# Patient Record
Sex: FEMALE | Race: WHITE | NOT HISPANIC OR LATINO | Employment: OTHER | URBAN - METROPOLITAN AREA
[De-identification: names, ages, dates, MRNs, and addresses within clinical notes are randomized per-mention and may not be internally consistent; named-entity substitution may affect disease eponyms.]

---

## 2017-01-25 ENCOUNTER — APPOINTMENT (OUTPATIENT)
Dept: OTHER | Facility: IMAGING CENTER | Age: 67
End: 2017-01-25

## 2017-01-25 PROCEDURE — 97530 THERAPEUTIC ACTIVITIES: CPT | Performed by: FAMILY MEDICINE

## 2017-02-16 ENCOUNTER — APPOINTMENT (OUTPATIENT)
Dept: OTHER | Facility: IMAGING CENTER | Age: 67
End: 2017-02-16

## 2017-02-16 PROCEDURE — 97530 THERAPEUTIC ACTIVITIES: CPT | Performed by: FAMILY MEDICINE

## 2017-03-01 ENCOUNTER — APPOINTMENT (OUTPATIENT)
Dept: OTHER | Facility: IMAGING CENTER | Age: 67
End: 2017-03-01

## 2017-03-01 PROCEDURE — 97530 THERAPEUTIC ACTIVITIES: CPT | Performed by: FAMILY MEDICINE

## 2017-03-13 ENCOUNTER — TELEPHONE (OUTPATIENT)
Dept: CARDIOLOGY | Facility: MEDICAL CENTER | Age: 67
End: 2017-03-13

## 2017-03-14 NOTE — TELEPHONE ENCOUNTER
PAR approved:  Ellen Buenrostro Key: [id number] - PA Case ID: [id number]   Outcome   Approvedtoday   Questionnaire submitted. PA Case [id number] Status: Approved. Authorization and Notifications Pending.   DrugCrestor 20MG tablets   [facility] Form

## 2017-03-15 ENCOUNTER — APPOINTMENT (OUTPATIENT)
Dept: OTHER | Facility: IMAGING CENTER | Age: 67
End: 2017-03-15

## 2017-03-15 PROCEDURE — 97530 THERAPEUTIC ACTIVITIES: CPT | Performed by: FAMILY MEDICINE

## 2017-03-20 ENCOUNTER — TELEPHONE (OUTPATIENT)
Dept: CARDIOLOGY | Facility: MEDICAL CENTER | Age: 67
End: 2017-03-20

## 2017-03-20 DIAGNOSIS — E78.00 PURE HYPERCHOLESTEROLEMIA: ICD-10-CM

## 2017-03-20 NOTE — TELEPHONE ENCOUNTER
----- Message from María Elena Callaway sent at 3/20/2017 10:48 AM PDT -----  Regarding: lab order mailed to home  RG/Rita        Patient would like lab order to be mailed to her home. She can be reached at [phone].

## 2017-03-23 ENCOUNTER — APPOINTMENT (OUTPATIENT)
Dept: OTHER | Facility: IMAGING CENTER | Age: 67
End: 2017-03-23

## 2017-03-23 PROCEDURE — 97530 THERAPEUTIC ACTIVITIES: CPT | Performed by: FAMILY MEDICINE

## 2017-03-29 ENCOUNTER — APPOINTMENT (OUTPATIENT)
Dept: OTHER | Facility: IMAGING CENTER | Age: 67
End: 2017-03-29

## 2017-03-29 PROCEDURE — 97530 THERAPEUTIC ACTIVITIES: CPT | Performed by: FAMILY MEDICINE

## 2017-04-05 LAB
ALBUMIN SERPL-MCNC: 4.5 G/DL (ref 3.6–4.8)
ALBUMIN/GLOB SERPL: 2.3 {RATIO} (ref 1.2–2.2)
ALP SERPL-CCNC: 65 IU/L (ref 39–117)
ALT SERPL-CCNC: 39 IU/L (ref 0–32)
AST SERPL-CCNC: 43 IU/L (ref 0–40)
BILIRUB SERPL-MCNC: 0.4 MG/DL (ref 0–1.2)
BUN SERPL-MCNC: 11 MG/DL (ref 8–27)
BUN/CREAT SERPL: 14 (ref 12–28)
CALCIUM SERPL-MCNC: 9.3 MG/DL (ref 8.7–10.3)
CHLORIDE SERPL-SCNC: 103 MMOL/L (ref 96–106)
CHOLEST SERPL-MCNC: 205 MG/DL (ref 100–199)
CO2 SERPL-SCNC: 26 MMOL/L (ref 18–29)
COMMENT 011824: ABNORMAL
CREAT SERPL-MCNC: 0.78 MG/DL (ref 0.57–1)
GLOBULIN SER CALC-MCNC: 2 G/DL (ref 1.5–4.5)
GLUCOSE SERPL-MCNC: 94 MG/DL (ref 65–99)
HDLC SERPL-MCNC: 75 MG/DL
LDLC SERPL CALC-MCNC: 121 MG/DL (ref 0–99)
POTASSIUM SERPL-SCNC: 4.3 MMOL/L (ref 3.5–5.2)
PROT SERPL-MCNC: 6.5 G/DL (ref 6–8.5)
SODIUM SERPL-SCNC: 142 MMOL/L (ref 134–144)
TRIGL SERPL-MCNC: 47 MG/DL (ref 0–149)
VLDLC SERPL CALC-MCNC: 9 MG/DL (ref 5–40)

## 2017-04-12 ENCOUNTER — APPOINTMENT (OUTPATIENT)
Dept: OTHER | Facility: IMAGING CENTER | Age: 67
End: 2017-04-12

## 2017-04-12 PROCEDURE — 97530 THERAPEUTIC ACTIVITIES: CPT | Performed by: FAMILY MEDICINE

## 2017-04-18 ENCOUNTER — OFFICE VISIT (OUTPATIENT)
Dept: CARDIOLOGY | Facility: MEDICAL CENTER | Age: 67
End: 2017-04-18
Payer: MEDICARE

## 2017-04-18 VITALS
HEIGHT: 68 IN | OXYGEN SATURATION: 94 % | WEIGHT: 131 LBS | DIASTOLIC BLOOD PRESSURE: 70 MMHG | BODY MASS INDEX: 19.85 KG/M2 | SYSTOLIC BLOOD PRESSURE: 124 MMHG | HEART RATE: 92 BPM

## 2017-04-18 DIAGNOSIS — I25.10 ATHEROSCLEROSIS OF NATIVE CORONARY ARTERY OF NATIVE HEART WITHOUT ANGINA PECTORIS: ICD-10-CM

## 2017-04-18 DIAGNOSIS — E78.00 PURE HYPERCHOLESTEROLEMIA: ICD-10-CM

## 2017-04-18 DIAGNOSIS — E78.01 FAMILIAL HYPERCHOLESTEROLEMIA: ICD-10-CM

## 2017-04-18 DIAGNOSIS — I10 ESSENTIAL HYPERTENSION: ICD-10-CM

## 2017-04-18 PROCEDURE — 3014F SCREEN MAMMO DOC REV: CPT | Mod: 8P | Performed by: INTERNAL MEDICINE

## 2017-04-18 PROCEDURE — G8598 ASA/ANTIPLAT THER USED: HCPCS | Performed by: INTERNAL MEDICINE

## 2017-04-18 PROCEDURE — 1101F PT FALLS ASSESS-DOCD LE1/YR: CPT | Mod: 8P | Performed by: INTERNAL MEDICINE

## 2017-04-18 PROCEDURE — G8420 CALC BMI NORM PARAMETERS: HCPCS | Performed by: INTERNAL MEDICINE

## 2017-04-18 PROCEDURE — 3017F COLORECTAL CA SCREEN DOC REV: CPT | Mod: 8P | Performed by: INTERNAL MEDICINE

## 2017-04-18 PROCEDURE — G8432 DEP SCR NOT DOC, RNG: HCPCS | Performed by: INTERNAL MEDICINE

## 2017-04-18 PROCEDURE — 4040F PNEUMOC VAC/ADMIN/RCVD: CPT | Mod: 8P | Performed by: INTERNAL MEDICINE

## 2017-04-18 PROCEDURE — 1036F TOBACCO NON-USER: CPT | Performed by: INTERNAL MEDICINE

## 2017-04-18 PROCEDURE — 99214 OFFICE O/P EST MOD 30 MIN: CPT | Performed by: INTERNAL MEDICINE

## 2017-04-18 NOTE — Clinical Note
[facility]   [address]   [address]  [place] [address]  Phone: [phone]  Fax: [phone]              Ellen Buenrostro  1950    Encounter Date: 4/18/2017    Parker Mancia M.D.          PROGRESS NOTE:  Subjective:   Ellen Buenrostro is a 66 y.o. female who presents today in follow-up for hypertension and hyperlipidemia.  She is asymptomatic.  She is very stressed because her  is dealing with prostate cancer.  She has no symptoms of heart disease.  Her brother takes Repatha for his severe hypercholesterolemia  Recent labs continues to straight mild elevation of transaminases as has been present for years. It is for this reason I am reluctant to increase Crestor to maximum dose of 40 mg per day.    Past Medical History   Diagnosis Date   • Undiagnosed cardiac murmurs    • Pure hypercholesterolemia    • Murmur      History reviewed. No pertinent past surgical history.  History reviewed. No pertinent family history.  History   Smoking status   • Never Smoker    Smokeless tobacco   • Former User     No Known Allergies  Outpatient Encounter Prescriptions as of 4/18/2017   Medication Sig Dispense Refill   • SELENIUM PO Take  by mouth.     • MILK THISTLE PO Take  by mouth.     • Evolocumab, REPATHA, (REPATHA SURECLICK) 140 MG/ML Solution Auto-injector Inject 1 Each as instructed Once for 1 dose. 1 Each 0   • rosuvastatin (CRESTOR) 20 MG Tab Take 1 Tab by mouth every evening. 90 Tab 3   • ezetimibe (ZETIA) 10 MG Tab Take 1 Tab by mouth every day. 90 Tab 3   • losartan (COZAAR) 25 MG Tab Take 1 Tab by mouth every day. 90 Tab 3   • levothyroxine (SYNTHROID) 25 MCG TABS Take 1 Tab by mouth every day. 90 Tab 0   • aspirin EC (ECOTRIN) 81 MG TBEC Take 81 mg by mouth every day.     • Probiotic CAPS Take  by mouth.     • Cholecalciferol (VITAMIN D PO) Take 2,000 Units by mouth every day.     • Coenzyme Q10 (COQ10 PO) Take 100 mg by  "mouth every day.     • CALCIUM CITRATE Take 1,000 mg by mouth every day.       No facility-administered encounter medications on file as of 4/18/2017.     ROS     Objective:   /70 mmHg  Pulse 92  Ht 1.727 m (5' 8\")  Wt 59.421 kg (131 lb)  BMI 19.92 kg/m2  SpO2 94%    Physical Exam    Assessment:     1. Pure hypercholesterolemia  ECHO-REST/STRESS W/O CONTRAST    Evolocumab, REPATHA, (REPATHA SURECLICK) 140 MG/ML Solution Auto-injector   2. Atherosclerosis of native coronary artery of native heart without angina pectoris     3. Essential hypertension     4. Familial hypercholesterolemia         Medical Decision Making:  Today's Assessment / Status / Plan:   With the , in the setting of coronary artery calcification, we should strongly consider a lower LDL.  I have recommended Repatha we will look into preauthorization for this.  Meanwhile in terms of any progression of her coronary artery calcification, I will order a stress echo.  Follow-up CAT scan or coronary artery calcification quantification is not particularly helpful regarding the question of coronary stenosis or not        Lucía PITTMAN M.D.  [id number]  [place] [address]  VIA Facsimile: [phone]                   "

## 2017-04-18 NOTE — PROGRESS NOTES
"Subjective:   Ellen Buenrostro is a 66 y.o. female who presents today in follow-up for hypertension and hyperlipidemia.  She is asymptomatic.  She is very stressed because her  is dealing with prostate cancer.  She has no symptoms of heart disease.  Her brother takes Repatha for his severe hypercholesterolemia  Recent labs continues to straight mild elevation of transaminases as has been present for years. It is for this reason I am reluctant to increase Crestor to maximum dose of 40 mg per day.    Past Medical History   Diagnosis Date   • Undiagnosed cardiac murmurs    • Pure hypercholesterolemia    • Murmur      History reviewed. No pertinent past surgical history.  History reviewed. No pertinent family history.  History   Smoking status   • Never Smoker    Smokeless tobacco   • Former User     No Known Allergies  Outpatient Encounter Prescriptions as of 4/18/2017   Medication Sig Dispense Refill   • SELENIUM PO Take  by mouth.     • MILK THISTLE PO Take  by mouth.     • Evolocumab, REPATHA, (REPATHA SURECLICK) 140 MG/ML Solution Auto-injector Inject 1 Each as instructed Once for 1 dose. 1 Each 0   • rosuvastatin (CRESTOR) 20 MG Tab Take 1 Tab by mouth every evening. 90 Tab 3   • ezetimibe (ZETIA) 10 MG Tab Take 1 Tab by mouth every day. 90 Tab 3   • losartan (COZAAR) 25 MG Tab Take 1 Tab by mouth every day. 90 Tab 3   • levothyroxine (SYNTHROID) 25 MCG TABS Take 1 Tab by mouth every day. 90 Tab 0   • aspirin EC (ECOTRIN) 81 MG TBEC Take 81 mg by mouth every day.     • Probiotic CAPS Take  by mouth.     • Cholecalciferol (VITAMIN D PO) Take 2,000 Units by mouth every day.     • Coenzyme Q10 (COQ10 PO) Take 100 mg by mouth every day.     • CALCIUM CITRATE Take 1,000 mg by mouth every day.       No facility-administered encounter medications on file as of 4/18/2017.     ROS     Objective:   /70 mmHg  Pulse 92  Ht 1.727 m (5' 8\")  Wt 59.421 kg (131 lb)  BMI 19.92 kg/m2  SpO2 94%    Physical " Exam    Assessment:     1. Pure hypercholesterolemia  ECHO-REST/STRESS W/O CONTRAST    Evolocumab, REPATHA, (REPATHA SURECLICK) 140 MG/ML Solution Auto-injector   2. Atherosclerosis of native coronary artery of native heart without angina pectoris     3. Essential hypertension     4. Familial hypercholesterolemia         Medical Decision Making:  Today's Assessment / Status / Plan:   With the , in the setting of coronary artery calcification, we should strongly consider a lower LDL.  I have recommended Repatha we will look into preauthorization for this.  Meanwhile in terms of any progression of her coronary artery calcification, I will order a stress echo.  Follow-up CAT scan or coronary artery calcification quantification is not particularly helpful regarding the question of coronary stenosis or not

## 2017-04-18 NOTE — MR AVS SNAPSHOT
"        Ellen Buenrostro   2017 2:15 PM   Office Visit   MRN: [id number]    Department:  [facility]   Dept Phone:  [phone]    Description:  Female : 1950   Provider:  Parker Mancia M.D.           Reason for Visit     Follow-Up           Allergies as of 2017     No Known Allergies      Vital Signs     Blood Pressure Pulse Height Weight Body Mass Index Oxygen Saturation    124/70 mmHg 92 1.727 m (5' 8\") 59.421 kg (131 lb) 19.92 kg/m2 94%    Smoking Status                   Never Smoker            Basic Information     Date Of Birth Sex Race Ethnicity Preferred Language    1950 Female White Non- English      Your appointments     2017 10:30 AM   [facility] (--)    [address]  [place] [address]   [phone]            May 10, 2017 10:30 AM   ACU GROUP with Adi Iglesias M.D.   [facility] (--)    [address]  [place] [address]   [phone]            May 24, 2017 10:30 AM   ACU GROUP with Adi Iglesias M.D.   [facility] (--)    [address]  [place] [address]   [phone]              Problem List              ICD-10-CM Priority Class Noted - Resolved    Pure hypercholesterolemia E78.00   Unknown - Present    Undiagnosed cardiac murmurs R01.1   Unknown - Present    HTN (hypertension) I10   2011 - Present    Other chest pain R07.89   2013 - Present    Coronary atherosclerosis of native coronary artery I25.10   2013 - Present      Health Maintenance        Date Due Completion Dates    IMM DTaP/Tdap/Td Vaccine (1 - Tdap) 1968 ---    PAP SMEAR 1970 ---    MAMMOGRAM 1989 ---    COLONOSCOPY 1999 ---    IMM ZOSTER VACCINE 2009 ---    BONE DENSITY 2014 ---    IMM PNEUMOCOCCAL 65+ (ADULT) LOW/MEDIUM RISK SERIES (1 of 2 - PCV13) " 5/21/2014 ---            Current Immunizations     No immunizations on file.      Below and/or attached are the medications your provider expects you to take. Review all of your home medications and newly ordered medications with your provider and/or pharmacist. Follow medication instructions as directed by your provider and/or pharmacist. Please keep your medication list with you and share with your provider. Update the information when medications are discontinued, doses are changed, or new medications (including over-the-counter products) are added; and carry medication information at all times in the event of emergency situations     Allergies:  No Known Allergies          Medications  Valid as of: April 18, 2017 -  2:37 PM    Generic Name Brand Name Tablet Size Instructions for use    Aspirin (Tablet Delayed Response) ECOTRIN 81 MG Take 81 mg by mouth every day.        Calcium Citrate Tetrahydrate   Take 1,000 mg by mouth every day.        Cholecalciferol   Take 2,000 Units by mouth every day.        Coenzyme Q10   Take 100 mg by mouth every day.        Ezetimibe (Tab) ZETIA 10 MG Take 1 Tab by mouth every day.        Levothyroxine Sodium (Tab) SYNTHROID 25 MCG Take 1 Tab by mouth every day.        Losartan Potassium (Tab) COZAAR 25 MG Take 1 Tab by mouth every day.        Milk Thistle   Take  by mouth.        Probiotic Product (Cap) Probiotic  Take  by mouth.        Rosuvastatin Calcium (Tab) CRESTOR 20 MG Take 1 Tab by mouth every evening.        Selenium   Take  by mouth.        .                 Medicines prescribed today were sent to:     [facility] [address] [place] [address] (S), [place] - [id number] [facility] (S) [place] [address]    Phone: [phone] Fax: [phone]    Open 24 Hours?: No      Medication refill instructions:       If your prescription bottle indicates you have medication refills left, it is not necessary to call your provider’s office. Please contact your pharmacy and they will  refill your medication.    If your prescription bottle indicates you do not have any refills left, you may request refills at any time through one of the following ways: The online [employer] system (except Urgent Care), by calling your provider’s office, or by asking your pharmacy to contact your provider’s office with a refill request. Medication refills are processed only during regular business hours and may not be available until the next business day. Your provider may request additional information or to have a follow-up visit with you prior to refilling your medication.   *Please Note: Medication refills are assigned a new Rx number when refilled electronically. Your pharmacy may indicate that no refills were authorized even though a new prescription for the same medication is available at the pharmacy. Please request the medicine by name with the pharmacy before contacting your provider for a refill.           [employer] Status: Patient Declined

## 2017-04-19 ENCOUNTER — TELEPHONE (OUTPATIENT)
Dept: CARDIOLOGY | Facility: MEDICAL CENTER | Age: 67
End: 2017-04-19

## 2017-04-19 NOTE — TELEPHONE ENCOUNTER
Working on a PAR for Repatha for patient:  Called patient to discuss tried and failed therapies and she stated that she has failed pravastatin, simvastatin and atorvastatin all treatments cause severe myalgias  She barely can tolerate high dose of Crestor

## 2017-04-19 NOTE — TELEPHONE ENCOUNTER
Ellen Buenrostro Key: [id number] - PA Case ID: [id number]   Outcome   Pendingtoday   Questionnaire submitted. PA Case [id number] Status: Pending review.   DrugRepatha SureClick 140MG/ML auto-injectors   [facility]

## 2017-04-21 DIAGNOSIS — E78.00 PURE HYPERCHOLESTEROLEMIA: ICD-10-CM

## 2017-04-21 DIAGNOSIS — I25.10 ATHEROSCLEROSIS OF NATIVE CORONARY ARTERY OF NATIVE HEART WITHOUT ANGINA PECTORIS: ICD-10-CM

## 2017-04-21 NOTE — TELEPHONE ENCOUNTER
She was informed of Repatha and stopping Crestor and Zetia.    She will comply and repeat lab in 3 months.  I asked her to call me to discuss at that time.

## 2017-04-24 DIAGNOSIS — I25.10 ATHEROSCLEROSIS OF NATIVE CORONARY ARTERY OF NATIVE HEART WITHOUT ANGINA PECTORIS: ICD-10-CM

## 2017-04-24 DIAGNOSIS — E78.00 PURE HYPERCHOLESTEROLEMIA: ICD-10-CM

## 2017-04-26 ENCOUNTER — APPOINTMENT (OUTPATIENT)
Dept: OTHER | Facility: IMAGING CENTER | Age: 67
End: 2017-04-26

## 2017-04-26 PROCEDURE — 97530 THERAPEUTIC ACTIVITIES: CPT | Performed by: FAMILY MEDICINE

## 2017-04-26 NOTE — TELEPHONE ENCOUNTER
She was informed and will comply with Repatha only.  I gave her the name:  Kimberlee Denson RN for future problems or questions with Repatha if needed.  (She works for [employer]).

## 2017-04-26 NOTE — TELEPHONE ENCOUNTER
She calls to question whether she should stay on the Crestor along with the Repatha based on discussions with Dr. Mancia.  To Dr. Mancia to advise.

## 2017-05-05 DIAGNOSIS — E78.00 PURE HYPERCHOLESTEROLEMIA: ICD-10-CM

## 2017-05-05 PROCEDURE — 93350 STRESS TTE ONLY: CPT | Performed by: INTERNAL MEDICINE

## 2017-05-10 ENCOUNTER — TELEPHONE (OUTPATIENT)
Dept: CARDIOLOGY | Facility: MEDICAL CENTER | Age: 67
End: 2017-05-10

## 2017-05-10 ENCOUNTER — APPOINTMENT (OUTPATIENT)
Dept: OTHER | Facility: IMAGING CENTER | Age: 67
End: 2017-05-10

## 2017-05-10 PROCEDURE — 97530 THERAPEUTIC ACTIVITIES: CPT | Performed by: FAMILY MEDICINE

## 2017-05-10 NOTE — TELEPHONE ENCOUNTER
----- Message from Parker Mancia M.D. sent at 5/10/2017 12:52 PM PDT -----  Very normal stress echo. Great news

## 2017-05-24 ENCOUNTER — APPOINTMENT (OUTPATIENT)
Dept: OTHER | Facility: IMAGING CENTER | Age: 67
End: 2017-05-24

## 2017-05-24 PROCEDURE — 97530 THERAPEUTIC ACTIVITIES: CPT | Performed by: FAMILY MEDICINE

## 2017-06-07 ENCOUNTER — APPOINTMENT (OUTPATIENT)
Dept: OTHER | Facility: IMAGING CENTER | Age: 67
End: 2017-06-07

## 2017-06-07 PROCEDURE — 97530 THERAPEUTIC ACTIVITIES: CPT | Performed by: FAMILY MEDICINE

## 2017-06-15 ENCOUNTER — TELEPHONE (OUTPATIENT)
Dept: CARDIOLOGY | Facility: MEDICAL CENTER | Age: 67
End: 2017-06-15

## 2017-06-15 NOTE — TELEPHONE ENCOUNTER
Spoke with patient regarding Repatha samples  She will come into the [facility] office to   QTY#4  Lot#[id number]  UOM: PAC

## 2017-06-19 ENCOUNTER — DOCUMENTATION (OUTPATIENT)
Dept: CARDIOLOGY | Facility: MEDICAL CENTER | Age: 67
End: 2017-06-19

## 2017-06-19 ENCOUNTER — TELEPHONE (OUTPATIENT)
Dept: CARDIOLOGY | Facility: MEDICAL CENTER | Age: 67
End: 2017-06-19

## 2017-06-19 NOTE — PROGRESS NOTES
Pt presents to office today to  Repatha samples as previously documented.    On arrival she mentions an axillary rash to staff and is concerned and I am asked to check it for possible med reaction after first Repatha dose.    Rash is healing at presents and is present more so under left axilla than right. Pt admits to recent increased perspiration, as well as a new tighter fitting garment that as we spoke about it she recalled actually feeling a bit uncomfortable in it.    Pt states she has been using ANDRÉS and melaleuca on rash. Pt encouraged to allow to air dry, keep try and offered some suggestions on how to do so.    Rash is localized to this area only. Pt shows me injection site. No sign of redness, irritation or swelling at site.    Pt is relieved and asks about future injections and what types of rashes she should be concerned about.

## 2017-06-21 ENCOUNTER — APPOINTMENT (OUTPATIENT)
Dept: OTHER | Facility: IMAGING CENTER | Age: 67
End: 2017-06-21

## 2017-06-21 PROCEDURE — 97530 THERAPEUTIC ACTIVITIES: CPT | Performed by: FAMILY MEDICINE

## 2017-07-05 ENCOUNTER — APPOINTMENT (OUTPATIENT)
Dept: OTHER | Facility: IMAGING CENTER | Age: 67
End: 2017-07-05

## 2017-07-05 PROCEDURE — 97530 THERAPEUTIC ACTIVITIES: CPT | Performed by: FAMILY MEDICINE

## 2017-07-12 ENCOUNTER — APPOINTMENT (OUTPATIENT)
Dept: OTHER | Facility: IMAGING CENTER | Age: 67
End: 2017-07-12

## 2017-07-12 PROCEDURE — 97530 THERAPEUTIC ACTIVITIES: CPT | Performed by: FAMILY MEDICINE

## 2017-08-03 ENCOUNTER — APPOINTMENT (OUTPATIENT)
Dept: OTHER | Facility: IMAGING CENTER | Age: 67
End: 2017-08-03

## 2017-08-03 PROCEDURE — 97530 THERAPEUTIC ACTIVITIES: CPT | Performed by: FAMILY MEDICINE

## 2017-08-16 ENCOUNTER — APPOINTMENT (OUTPATIENT)
Dept: OTHER | Facility: IMAGING CENTER | Age: 67
End: 2017-08-16

## 2017-08-16 PROCEDURE — 97530 THERAPEUTIC ACTIVITIES: CPT | Performed by: FAMILY MEDICINE

## 2017-08-30 ENCOUNTER — APPOINTMENT (OUTPATIENT)
Dept: OTHER | Facility: IMAGING CENTER | Age: 67
End: 2017-08-30

## 2017-08-30 PROCEDURE — 97530 THERAPEUTIC ACTIVITIES: CPT | Performed by: FAMILY MEDICINE

## 2017-09-06 ENCOUNTER — APPOINTMENT (OUTPATIENT)
Dept: OTHER | Facility: IMAGING CENTER | Age: 67
End: 2017-09-06

## 2017-09-06 PROCEDURE — 97530 THERAPEUTIC ACTIVITIES: CPT | Performed by: FAMILY MEDICINE

## 2017-09-20 ENCOUNTER — APPOINTMENT (OUTPATIENT)
Dept: OTHER | Facility: IMAGING CENTER | Age: 67
End: 2017-09-20

## 2017-09-20 PROCEDURE — 97530 THERAPEUTIC ACTIVITIES: CPT | Performed by: FAMILY MEDICINE

## 2017-10-04 ENCOUNTER — APPOINTMENT (OUTPATIENT)
Dept: OTHER | Facility: IMAGING CENTER | Age: 67
End: 2017-10-04

## 2017-10-04 PROCEDURE — 97530 THERAPEUTIC ACTIVITIES: CPT | Performed by: FAMILY MEDICINE

## 2017-10-12 ENCOUNTER — APPOINTMENT (OUTPATIENT)
Dept: OTHER | Facility: IMAGING CENTER | Age: 67
End: 2017-10-12

## 2017-10-12 PROCEDURE — 97530 THERAPEUTIC ACTIVITIES: CPT | Performed by: FAMILY MEDICINE

## 2017-11-01 ENCOUNTER — APPOINTMENT (OUTPATIENT)
Dept: OTHER | Facility: IMAGING CENTER | Age: 67
End: 2017-11-01

## 2017-11-01 PROCEDURE — 97530 THERAPEUTIC ACTIVITIES: CPT | Performed by: FAMILY MEDICINE

## 2017-11-15 ENCOUNTER — APPOINTMENT (OUTPATIENT)
Dept: OTHER | Facility: IMAGING CENTER | Age: 67
End: 2017-11-15

## 2017-11-15 PROCEDURE — 97530 THERAPEUTIC ACTIVITIES: CPT | Performed by: FAMILY MEDICINE

## 2017-12-04 ENCOUNTER — APPOINTMENT (OUTPATIENT)
Dept: OTHER | Facility: IMAGING CENTER | Age: 67
End: 2017-12-04

## 2017-12-04 PROCEDURE — 97530 THERAPEUTIC ACTIVITIES: CPT | Performed by: FAMILY MEDICINE

## 2017-12-14 ENCOUNTER — APPOINTMENT (OUTPATIENT)
Dept: OTHER | Facility: IMAGING CENTER | Age: 67
End: 2017-12-14

## 2017-12-14 PROCEDURE — 97530 THERAPEUTIC ACTIVITIES: CPT | Performed by: FAMILY MEDICINE

## 2017-12-27 ENCOUNTER — APPOINTMENT (OUTPATIENT)
Dept: OTHER | Facility: IMAGING CENTER | Age: 67
End: 2017-12-27

## 2017-12-27 PROCEDURE — 97530 THERAPEUTIC ACTIVITIES: CPT | Performed by: FAMILY MEDICINE

## 2018-01-08 ENCOUNTER — APPOINTMENT (OUTPATIENT)
Dept: OTHER | Facility: IMAGING CENTER | Age: 68
End: 2018-01-08

## 2018-01-08 PROCEDURE — 97530 THERAPEUTIC ACTIVITIES: CPT | Performed by: FAMILY MEDICINE

## 2018-01-24 ENCOUNTER — APPOINTMENT (OUTPATIENT)
Dept: OTHER | Facility: IMAGING CENTER | Age: 68
End: 2018-01-24

## 2018-02-07 ENCOUNTER — APPOINTMENT (OUTPATIENT)
Dept: OTHER | Facility: IMAGING CENTER | Age: 68
End: 2018-02-07

## 2018-02-21 ENCOUNTER — APPOINTMENT (OUTPATIENT)
Dept: OTHER | Facility: IMAGING CENTER | Age: 68
End: 2018-02-21

## 2018-03-02 ENCOUNTER — APPOINTMENT (OUTPATIENT)
Dept: OTHER | Facility: IMAGING CENTER | Age: 68
End: 2018-03-02

## 2018-03-28 ENCOUNTER — APPOINTMENT (OUTPATIENT)
Dept: OTHER | Facility: IMAGING CENTER | Age: 68
End: 2018-03-28

## 2018-08-22 ENCOUNTER — APPOINTMENT (OUTPATIENT)
Dept: OTHER | Facility: IMAGING CENTER | Age: 68
End: 2018-08-22

## 2018-08-29 ENCOUNTER — APPOINTMENT (OUTPATIENT)
Dept: OTHER | Facility: IMAGING CENTER | Age: 68
End: 2018-08-29

## 2019-02-07 ENCOUNTER — APPOINTMENT (OUTPATIENT)
Dept: OTHER | Facility: IMAGING CENTER | Age: 69
End: 2019-02-07

## 2019-02-15 ENCOUNTER — APPOINTMENT (OUTPATIENT)
Dept: OTHER | Facility: IMAGING CENTER | Age: 69
End: 2019-02-15

## 2019-06-24 ENCOUNTER — TELEPHONE (OUTPATIENT)
Dept: SCHEDULING | Facility: IMAGING CENTER | Age: 69
End: 2019-06-24

## 2021-01-15 DIAGNOSIS — Z23 NEED FOR VACCINATION: ICD-10-CM

## 2021-01-23 ENCOUNTER — IMMUNIZATION (OUTPATIENT)
Dept: FAMILY PLANNING/WOMEN'S HEALTH CLINIC | Facility: IMMUNIZATION CENTER | Age: 71
End: 2021-01-23
Attending: INTERNAL MEDICINE
Payer: MEDICARE

## 2021-01-23 DIAGNOSIS — Z23 NEED FOR VACCINATION: ICD-10-CM

## 2021-01-23 DIAGNOSIS — Z23 ENCOUNTER FOR VACCINATION: Primary | ICD-10-CM

## 2021-01-23 PROCEDURE — 0001A PFIZER SARS-COV-2 VACCINE: CPT | Performed by: INTERNAL MEDICINE

## 2021-01-23 PROCEDURE — 91300 PFIZER SARS-COV-2 VACCINE: CPT | Performed by: INTERNAL MEDICINE

## 2021-02-12 ENCOUNTER — IMMUNIZATION (OUTPATIENT)
Dept: FAMILY PLANNING/WOMEN'S HEALTH CLINIC | Facility: IMMUNIZATION CENTER | Age: 71
End: 2021-02-12
Payer: MEDICARE

## 2021-02-12 DIAGNOSIS — Z23 ENCOUNTER FOR VACCINATION: Primary | ICD-10-CM

## 2021-02-12 PROCEDURE — 0002A PFIZER SARS-COV-2 VACCINE: CPT

## 2021-02-12 PROCEDURE — 91300 PFIZER SARS-COV-2 VACCINE: CPT

## 2021-02-13 ENCOUNTER — APPOINTMENT (OUTPATIENT)
Dept: FAMILY PLANNING/WOMEN'S HEALTH CLINIC | Facility: IMMUNIZATION CENTER | Age: 71
End: 2021-02-13
Attending: INTERNAL MEDICINE
Payer: MEDICARE